# Patient Record
(demographics unavailable — no encounter records)

---

## 2025-01-04 NOTE — HISTORY OF PRESENT ILLNESS
[Preoperative Visit] : for a medical evaluation prior to surgery [Fever] : no fever [Chills] : no chills [Runny Nose] : no runny nose [Earache] : no earache [Headache] : no headache [Sore Throat] : no sore throat [Cough] : no cough [Appetite] : no decrease in appetite [Nausea] : no nausea [Vomiting] : no vomiting [Abdominal Pain] : no abdominal pain [Diarrhea] : no diarrhea [Easy Bruising] : no easy bruising [Rash] : no rash [Dysuria] : no dysuria [Urinary Frequency] : no urinary frequency [Prior Anesthesia] : No prior anesthesia [Prev Anesthesia Reaction] : no previous anesthesia reaction [Diabetes] : no diabetes [Pulmonary Disease] : no pulmonary disease [GI Disease] : no gastrointestinal disease [Renal Disease] : no renal disease [Sleep Apnea] : no sleep apnea [Bleeding Disorder] : no bleeding disorder [Transfusion Reaction] : no transfusion reaction [Impaired Immunity] : no impaired immunity [Frequent use of NSAIDs] : no use of NSAIDs [Anesthesia Reaction] : no anesthesia reaction [Clotting Disorder] : no clotting disorder [Sudden Death] : no sudden death [FreeTextEntry1] : T+A [FreeTextEntry2] : 1/10/25 [de-identified] : Dr. Jessica Shin

## 2025-04-21 NOTE — HISTORY OF PRESENT ILLNESS
[de-identified] : Doing well post adenotonsillectomy. No VPI symptoms such as speech changes or nasal regurgitation of food or liquids. Quiet breathing during sleep. Tolerating food by mouth well. Normal activity. No fevers or neck stiffness. No bleeding. No pain after about one week.

## 2025-04-21 NOTE — ASSESSMENT
[FreeTextEntry1] : Doing well post adenotonsillectomy. No VPI symptoms such as speech changes or nasal regurgitation of food or liquids. Quiet breathing during sleep. Tolerating food by mouth well. Normal activity. No fevers or neck stiffness. No bleeding. No pain after about one week.

## 2025-04-21 NOTE — CONSULT LETTER
[Dear  ___] : Dear  [unfilled], [Consult Letter:] : I had the pleasure of evaluating your patient, [unfilled]. [Please see my note below.] : Please see my note below. [Consult Closing:] : Thank you very much for allowing me to participate in the care of this patient.  If you have any questions, please do not hesitate to contact me. [Sincerely,] : Sincerely, [FreeTextEntry3] : Jessica Shin MD   Pediatric Otolaryngology/ Head & Neck Surgery [facility] , Otolaryngology; [facility]  [facility] [place] [address] Tel [phone] Fax [phone]